# Patient Record
(demographics unavailable — no encounter records)

---

## 2018-01-01 NOTE — HPH
ADMIT DATE:  2018



Baby boy is a full term newly born infant boy born via vaginal delivery to a now

 mom.  Prenatal labs were negative.  Delivery was uneventful.  Rupture of

membranes were less than 18 hours.  Apgars were adequate.



PHYSICAL EXAMINATION:  Benign.

Anterior fontanelle soft and flat.  Oropharynx is clear.

NECK:  Supple.

HEART:  S1, S2 audible.  No murmurs.

LUNGS:  Clear bilaterally.

ABDOMEN:  Good bowel sounds, soft abdomen.  Umbilicus has 3 vessels.

GENITOURINARY:  Normal.

EXTREMITIES:  Negative hip click.  



Initial Accu-Chek was adequate, but it did drop.  Baby is feeding well, though. 

So mom is breastfeeding and supplementing and they are deciding about a

circumcision, but I expect routine  care.  I will follow along.





______________________________

Whit Maxwell MD



DR:  YI/kady  JOB# [id number]  [id number]

DD:  2018 12:07  DT:  2018 12:31

## 2018-01-01 NOTE — DSH
DATE OF DISCHARGE:  2018



ADMITTING DIAGNOSIS:  Term  infant boy.



DISCHARGE DIAGNOSIS:  Term  infant boy.



HOSPITAL COURSE:  Baby boy born is a full-term newly born infant boy born via

vaginal delivery to a now  mom.  Prenatal labs were negative.  Rupture of

membranes was less than 18 hours.  Delivery was uneventful.  Apgars were good. 

Baby is on the large side.  So Accu-Cheks were obtained.  First Accu-Chek was

normal, but he had some low Accu-Cheks after that.  Mom has been supplementing

with her breast feeding and the sugars are improving.  He did pass his hearing

test and  screen was done.  Parents requested a circumcision.  However,

he had a lot of swelling and a short penile shaft.  So, I explained to them and

I could not get a circumcision done at this time, but we will arrange one as an

outpatient if they want to do so.  But at this time, he is stable and he is

ready for discharge.  The 24-hour bilirubin was only 5.2, which is adequate.  He

will be discharged to parents.  They want to follow up with me, so my office

staff will make an appointment to follow up at the 2-week yovany, but if they want

to do a circumcision, they will call me and I will schedule as an outpatient to

do next week.





______________________________

Whit Maxwell MD



DR:  YI/kady  JOB# [id number]  [id number]

DD:  2018 16:33  DT:  2018 23:12